# Patient Record
Sex: MALE | Race: WHITE
[De-identification: names, ages, dates, MRNs, and addresses within clinical notes are randomized per-mention and may not be internally consistent; named-entity substitution may affect disease eponyms.]

---

## 2017-04-05 NOTE — ER DOCUMENT REPORT
ED Medical Screen (RME)





- General


Chief Complaint: Skin Sore(s)


Stated Complaint: SKIN PROBLEM


Notes: 


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

2015.  He is left with a left hemiparesis.  He does get up and walk with a 4 

foot he cane.  He reports he has had problems with that source, he was on a 24 

day course of antibiotics which she finished 2 weeks ago.  He is told by his 

attendant that there is some drainage from one of the bedsores at this time.  

He does sleep on his back and thinks that and another time he sits in a 

wheelchair may be contributing to the problem.





I have greeted and performed a rapid initial assessment of this patient.  A 

comprehensive ED assessment and evaluation of the patient, analysis of test 

results and completion of the medical decision making process will be conducted 

by additional ED providers.


TRAVEL OUTSIDE OF THE [place] IN LAST 30 DAYS: No





- Related Data


Allergies/Adverse Reactions: 


 





No Known Allergies Allergy (Verified 04/05/17 10:44)


 











Past Medical History


Renal/ Medical History: Denies: Hx Peritoneal Dialysis





Physical Exam





- Vital signs


Vitals: 


 











Temp Pulse Resp BP Pulse Ox


 


 98.4 F   68   14   99/60 L  96 


 


 04/05/17 10:48  04/05/17 10:48  04/05/17 10:48  04/05/17 10:48  04/05/17 10:48














Course





- Vital Signs


Vital signs: 


 











Temp Pulse Resp BP Pulse Ox


 


 98.4 F   68   14   99/60 L  96 


 


 04/05/17 10:48  04/05/17 10:48  04/05/17 10:48  04/05/17 10:48  04/05/17 10:48

## 2017-04-05 NOTE — ER DOCUMENT REPORT
ED Skin Rash/Insect Bite/Abscs





- General


Chief Complaint: Skin Sore(s)


Stated Complaint: SKIN PROBLEM


Mode of Arrival: Ambulatory


Information source: Patient


TRAVEL OUTSIDE OF THE [place] IN LAST 30 DAYS: No





- HPI


Patient complains to provider of: Skin rash/lesion


Onset: This morning


Severity: None


Notes: 


Patient lives with his family at the bedside.  The patient had a stroke is 

hemiplegic.  He has a known wound to the sacral area and is being treated by 

wound care and a home nurse.  This morning the home nurse came was noted to 

have blood through his depends and through the sheets into the mattress.  They 

believe that the blood came from his bed sore.  No active bleeding currently.  

No bright red in nature.  He denies any pain, fever, abdominal pain, nausea, 

vomiting, diarrhea.  He denies any blood thinners.  He denies any injury to 

this area.  Feels like the bleeding has since resolved.  No other complaints at 

this moment.





- Related Data


Allergies/Adverse Reactions: 


 





No Known Allergies Allergy (Verified 04/05/17 13:24)


 











Past Medical History





- Social History


Smoking Status: Unknown if Ever Smoked


Family History: Reviewed & Not Pertinent


Patient has suicidal ideation: No


Patient has homicidal ideation: No


Renal/ Medical History: Denies: Hx Peritoneal Dialysis





Review of Systems





- Review of Systems


-: Yes All other systems reviewed and negative





Physical Exam





- Vital signs


Vitals: 


 











Temp Pulse Resp BP Pulse Ox


 


 98.4 F   68   14   99/60 L  96 


 


 04/05/17 10:48  04/05/17 10:48  04/05/17 10:48  04/05/17 10:48  04/05/17 10:48














- General


General appearance: Appears well, Alert





- HEENT


Head: Normocephalic, Atraumatic


Eyes: Normal


Pupils: PERRL


Mucous membranes: Normal


Pharynx: Normal





- Respiratory


Respiratory status: No respiratory distress


Breath sounds: Normal





- Cardiovascular


Rhythm: Regular


Heart sounds: Normal auscultation


Murmur: No





- Abdominal


Inspection: Normal


Distension: No distension


Bowel sounds: Normal


Tenderness: Nontender


Organomegaly: No organomegaly





- Rectal


Stool: See lab result, Other - Patient had light brown colored stool, there is 

no obvious blood, no melanotic stool.


Hemorrhoids: None





- Back


Back: Normal, Nontender





- Extremities


General upper extremity: Normal inspection, Nontender, Normal color, Normal ROM

, Normal temperature


General lower extremity: Normal inspection, Nontender, Normal color, Normal ROM

, Normal temperature, Normal weight bearing.  No: Darlene's sign





- Neurological


Cognition: Normal


Orientation: AAOx4


Notes: 


Weakness to the right side and prior stroke





- Psychological


Associated symptoms: Normal affect, Normal mood





- Skin


Skin Temperature: Warm


Skin Moisture: Dry


Skin Color: Normal


Notes: 


Patient is a few sinuses to the gluteal cleft and a small scabbed area to the 

buttock.  There is no significant bed sores noted.  There is no bleeding noted.

  There is small amount of dried blood within his dependent.  No tenderness or 

erythema.





Course





- Re-evaluation


Re-evalutation: 





04/05/17 14:01


Patient is nontoxic appearing stable vitals.  The patient is being treated for 

pressure ulcers to his buttock by wound care.  This morning his home nurse 

noticed that he had blood in his depends that had gone through his depends his 

sheets and his mattress.  They're concerned that his wounds were bleeding.  No 

active bleeding once I saw him in the emergency department.  He is on no blood 

thinners.  He has a benign exam with a few small areas of skin breakdown to the 

sacral area.  Do not appreciate any active bleeding at this time.  Rectal exam 

was benign with light green color stool which was Hemoccult negative.  

Hemoglobin is stable.  The remainder of his labs are stable.  He denies any 

fever and has no pain.  This point the patient can be discharged home with 

close follow-up with his own doctor.  Follow-up sooner for increased pain, fever

, persistent vomiting, or any further concerns.The patient's emergency 

department workup and current diagnosis were explained to the patient and or 

family.  Follow-up instructions were provided.  Medications if prescribed were 

discussed. Instructions for when to return to the emergency department 

including specific  worrisome symptoms were discussed with the patient and/or 

family.





- Vital Signs


Vital signs: 


 











Temp Pulse Resp BP Pulse Ox


 


 98.4 F   68   14   99/60 L  96 


 


 04/05/17 10:48  04/05/17 10:48  04/05/17 10:48  04/05/17 10:48  04/05/17 10:48














- Laboratory


Result Diagrams: 


 04/05/17 11:40





 04/05/17 11:40


Laboratory results interpreted by me: 


 











  04/05/17





  11:40


 


RDW  15.0 H














Discharge





- Discharge


Clinical Impression: 


 Bleeding from wound





Condition: Stable


Disposition: HOME, SELF-CARE


Instructions:  Dressing Instructions for Open Wounds ([facility])


Additional Instructions: 


Follow-up with your doctor the next day appointment.  Follow-up sooner for 

increased pain, fever, increased bleeding, passing out, or any further concerns.

## 2017-08-08 ENCOUNTER — HOSPITAL ENCOUNTER (OUTPATIENT)
Dept: HOSPITAL 62 - ER | Age: 67
Setting detail: OBSERVATION
LOS: 2 days | Discharge: HOME HEALTH | End: 2017-08-10
Attending: FAMILY MEDICINE | Admitting: FAMILY MEDICINE
Payer: MEDICARE

## 2017-08-08 DIAGNOSIS — I25.10: ICD-10-CM

## 2017-08-08 DIAGNOSIS — R11.2: ICD-10-CM

## 2017-08-08 DIAGNOSIS — N39.0: ICD-10-CM

## 2017-08-08 DIAGNOSIS — K21.9: ICD-10-CM

## 2017-08-08 DIAGNOSIS — I69.354: ICD-10-CM

## 2017-08-08 DIAGNOSIS — N28.9: ICD-10-CM

## 2017-08-08 DIAGNOSIS — I65.29: ICD-10-CM

## 2017-08-08 DIAGNOSIS — Z79.82: ICD-10-CM

## 2017-08-08 DIAGNOSIS — Z82.3: ICD-10-CM

## 2017-08-08 DIAGNOSIS — E78.5: ICD-10-CM

## 2017-08-08 DIAGNOSIS — R19.7: ICD-10-CM

## 2017-08-08 DIAGNOSIS — L89.302: ICD-10-CM
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
## 2017-08-08 DIAGNOSIS — Z79.899: ICD-10-CM

## 2017-08-08 DIAGNOSIS — I95.1: Primary | ICD-10-CM

## 2017-08-08 DIAGNOSIS — Z87.891: ICD-10-CM

## 2017-08-08 DIAGNOSIS — Z82.49: ICD-10-CM

## 2017-08-08 LAB
ALBUMIN SERPL-MCNC: 3.7 G/DL (ref 3.5–5)
ALP SERPL-CCNC: 77 U/L (ref 38–126)
ALT SERPL-CCNC: 46 U/L (ref 21–72)
ANION GAP SERPL CALC-SCNC: 7 MMOL/L (ref 5–19)
APPEARANCE UR: (no result)
AST SERPL-CCNC: 31 U/L (ref 17–59)
BASOPHILS # BLD AUTO: 0 10^3/UL (ref 0–0.2)
BASOPHILS NFR BLD AUTO: 0.5 % (ref 0–2)
BILIRUB DIRECT SERPL-MCNC: 0.3 MG/DL (ref 0–0.4)
BILIRUB SERPL-MCNC: 0.4 MG/DL (ref 0.2–1.3)
BILIRUB UR QL STRIP: NEGATIVE
BUN SERPL-MCNC: 16 MG/DL (ref 7–20)
CALCIUM: 9 MG/DL (ref 8.4–10.2)
CHLORIDE SERPL-SCNC: 110 MMOL/L (ref 98–107)
CK MB SERPL-MCNC: 1.3 NG/ML (ref ?–4.55)
CK SERPL-CCNC: 45 U/L (ref 55–170)
CO2 SERPL-SCNC: 27 MMOL/L (ref 22–30)
CREAT SERPL-MCNC: 0.74 MG/DL (ref 0.52–1.25)
EOSINOPHIL # BLD AUTO: 0 10^3/UL (ref 0–0.6)
EOSINOPHIL NFR BLD AUTO: 0.5 % (ref 0–6)
ERYTHROCYTE [DISTWIDTH] IN BLOOD BY AUTOMATED COUNT: 15.1 % (ref 11.5–14)
GLUCOSE SERPL-MCNC: 114 MG/DL (ref 75–110)
GLUCOSE UR STRIP-MCNC: NEGATIVE MG/DL
HCT VFR BLD CALC: 41.5 % (ref 37.9–51)
HGB BLD-MCNC: 14 G/DL (ref 13.5–17)
HGB HCT DIFFERENCE: 0.5
KETONES UR STRIP-MCNC: (no result) MG/DL
LYMPHOCYTES # BLD AUTO: 0.8 10^3/UL (ref 0.5–4.7)
LYMPHOCYTES NFR BLD AUTO: 8.5 % (ref 13–45)
MCH RBC QN AUTO: 31.6 PG (ref 27–33.4)
MCHC RBC AUTO-ENTMCNC: 33.8 G/DL (ref 32–36)
MCV RBC AUTO: 93 FL (ref 80–97)
MONOCYTES # BLD AUTO: 0.6 10^3/UL (ref 0.1–1.4)
MONOCYTES NFR BLD AUTO: 6.8 % (ref 3–13)
NEUTROPHILS # BLD AUTO: 7.9 10^3/UL (ref 1.7–8.2)
NEUTS SEG NFR BLD AUTO: 83.7 % (ref 42–78)
NITRITE UR QL STRIP: NEGATIVE
PH UR STRIP: 5 [PH] (ref 5–9)
POTASSIUM SERPL-SCNC: 4.2 MMOL/L (ref 3.6–5)
PROT SERPL-MCNC: 6.5 G/DL (ref 6.3–8.2)
PROT UR STRIP-MCNC: 30 MG/DL
RBC # BLD AUTO: 4.44 10^6/UL (ref 4.35–5.55)
SODIUM SERPL-SCNC: 144.2 MMOL/L (ref 137–145)
SP GR UR STRIP: 1.03
TROPONIN I SERPL-MCNC: < 0.012 NG/ML
UROBILINOGEN UR-MCNC: NEGATIVE MG/DL (ref ?–2)
WBC # BLD AUTO: 9.4 10^3/UL (ref 4–10.5)

## 2017-08-08 PROCEDURE — 84100 ASSAY OF PHOSPHORUS: CPT

## 2017-08-08 PROCEDURE — 87086 URINE CULTURE/COLONY COUNT: CPT

## 2017-08-08 PROCEDURE — 70450 CT HEAD/BRAIN W/O DYE: CPT

## 2017-08-08 PROCEDURE — 36415 COLL VENOUS BLD VENIPUNCTURE: CPT

## 2017-08-08 PROCEDURE — 71010: CPT

## 2017-08-08 PROCEDURE — 93010 ELECTROCARDIOGRAM REPORT: CPT

## 2017-08-08 PROCEDURE — 85025 COMPLETE CBC W/AUTO DIFF WBC: CPT

## 2017-08-08 PROCEDURE — 82550 ASSAY OF CK (CPK): CPT

## 2017-08-08 PROCEDURE — 84484 ASSAY OF TROPONIN QUANT: CPT

## 2017-08-08 PROCEDURE — 80053 COMPREHEN METABOLIC PANEL: CPT

## 2017-08-08 PROCEDURE — 83605 ASSAY OF LACTIC ACID: CPT

## 2017-08-08 PROCEDURE — 99285 EMERGENCY DEPT VISIT HI MDM: CPT

## 2017-08-08 PROCEDURE — G0378 HOSPITAL OBSERVATION PER HR: HCPCS

## 2017-08-08 PROCEDURE — 87040 BLOOD CULTURE FOR BACTERIA: CPT

## 2017-08-08 PROCEDURE — 84443 ASSAY THYROID STIM HORMONE: CPT

## 2017-08-08 PROCEDURE — 82553 CREATINE MB FRACTION: CPT

## 2017-08-08 PROCEDURE — 80048 BASIC METABOLIC PNL TOTAL CA: CPT

## 2017-08-08 PROCEDURE — 96360 HYDRATION IV INFUSION INIT: CPT

## 2017-08-08 PROCEDURE — 97116 GAIT TRAINING THERAPY: CPT

## 2017-08-08 PROCEDURE — 93005 ELECTROCARDIOGRAM TRACING: CPT

## 2017-08-08 PROCEDURE — 97163 PT EVAL HIGH COMPLEX 45 MIN: CPT

## 2017-08-08 PROCEDURE — 81001 URINALYSIS AUTO W/SCOPE: CPT

## 2017-08-08 PROCEDURE — 83735 ASSAY OF MAGNESIUM: CPT

## 2017-08-08 NOTE — ER DOCUMENT REPORT
ED Dizziness/Weakness





- General


Mode of Arrival: Ambulatory


Information source: Patient


TRAVEL OUTSIDE OF THE [place] IN LAST 30 DAYS: No





- HPI


Patient complains to provider of: Dizziness, Syncope, Other - vomiting


Onset: Just prior to arrival





<ALISSON HERNÁNDEZ - Last Filed: 08/08/17 20:56>





<FRENCH PENA - Last Filed: 08/08/17 23:09>





- General


Chief Complaint: Dizziness


Stated Complaint: DIZZINESS


Time Seen by Provider: 08/08/17 15:16


Notes: 


Patient is a 67 year old male that presents to the emergency department today 

with complaints of feeling lightheaded with associated vomiting. Caretaker at 

bedside states the patient vomited at home a couple of days ago as well. 

Caregiver states that two days ago, when the patient was alone, he fell when 

walking from the kitchen to the living room. Caregiver states earlier today the 

patient fell and began vomiting after the fall. Caregiver and patient deny any 

recent fevers.  (ALISSON HERNÁNDEZ)





- Related Data


Allergies/Adverse Reactions: 


 





No Known Allergies Allergy (Verified 08/08/17 18:39)


 








Home Medications: 


 Current Home Medications





Aspirin [Ecotrin 325 mg EC Tablet] 325 mg PO DAILY 08/08/17 [History]


Atorvastatin Calcium [Lipitor 40 mg Tablet] 40 mg PO QHS 08/08/17 [History]


Baclofen [Baclofen 10 mg Tablet] 10 mg PO QID 08/08/17 [History]


Docusate Sodium [Colace 100 mg Capsule] 100 mg PO DAILY 08/08/17 [History]


Esomeprazole Magnesium [Nexium] 40 mg PO QAM 08/08/17 [History]


Finasteride [Proscar 5 mg Tablet] 5 mg PO DAILY 08/08/17 [History]


Fluoxetine HCl [Prozac 20 mg Capsule] 20 mg PO DAILY 08/08/17 [History]


Melatonin/Pyridoxine HCl (B6) [Melatonin 3 mg Tablet] 1 each PO QHS 08/08/17 [

History]


Tadalafil [Cialis] 5 mg PO DAILY 08/08/17 [History]


Tamsulosin HCl [Flomax 0.4 mg Cap.sr] 0.4 mg PO BID 08/08/17 [History]


Temazepam [Restoril] 30 mg PO QHS 08/08/17 [History]











Past Medical History





- General


Information source: Patient





- Social History


Smoking Status: Never Smoker


Cigarette use (# per day): No


Frequency of alcohol use: None


Drug Abuse: None


Lives with: Family


Family History: Reviewed & Not Pertinent





- Past Medical History


Cardiac Medical History: Reports: Hx Hypercholesterolemia


GI Medical History: Reports: Hx Gastroesophageal Reflux Disease


Surgical Hx: Negative





<ALISSON HERNÁNDEZ - Last Filed: 08/08/17 20:56>





Review of Systems





- Review of Systems


Constitutional: denies: Fever


EENT: No symptoms reported


Cardiovascular: See HPI, Lightheaded, Other - hypotensive


Respiratory: No symptoms reported


Gastrointestinal: See HPI, Vomiting


Genitourinary: No symptoms reported


Male Genitourinary: No symptoms reported


Musculoskeletal: No symptoms reported


Skin: No symptoms reported


Hematologic/Lymphatic: No symptoms reported


Neurological/Psychological: No symptoms reported


-: Yes All other systems reviewed and negative





<ALISSON HERNÁNDEZ - Last Filed: 08/08/17 20:56>





Physical Exam





- Vital signs


Interpretation: Hypotensive





<ALISSON HERNÁNDEZ - Last Filed: 08/08/17 20:56>





- Skin


Skin irregularity: Decubitus ulcer





<FRENCH PENA - Last Filed: 08/08/17 23:09>





- Vital signs


Vitals: 


 











Temp Pulse Resp BP Pulse Ox


 


 98.2 F   65   18   104/47 L  95 


 


 08/08/17 15:18  08/08/17 15:18  08/08/17 15:18  08/08/17 15:18  08/08/17 15:18














- Notes


Notes: 


Physical Exam:


 


General: Alert, appears at baseline. 


 


HEENT: Normocephalic. Atraumatic. PERRL. Extraocular movements intact. 

Oropharynx clear. Dry mucous membranes.


 


Neck: Supple. Non-tender.


 


Respiratory: No respiratory distress. Clear and equal breath sounds bilaterally.


 


Cardiovascular: Regular rate and rhythm. 


 


Abdominal: Normal Inspection. Non-tender. No distension. Normal Bowel Sounds. 


 


Back: Non-tender. No deformity or step off.


 


Extremities: Moves all four extremities.


Upper extremities: Normal inspection. Normal ROM.  


Lower extremities: Normal inspection. No edema. Normal ROM.


 


Neurological: Normal cognition. AAOx4. Normal speech. 5/5 strength on the right 

upper/lower extremities, 4/5 strength on left upper/lower extremities which is 

at baseline. 


 


Psychological: Normal affect. Normal Mood. 


 


Skin: Warm. Dry. Normal color. (ALISSON HERNÁNDEZ)





Course





- Laboratory


Result Diagrams: 


 08/08/17 16:01





 08/08/17 16:01





<ALISSON HERNÁNDEZ - Last Filed: 08/08/17 20:56>





- Laboratory


Result Diagrams: 


 08/08/17 16:01





 08/08/17 16:01





- Diagnostic Test


Radiology reviewed: Reports reviewed





<FRENCH PENA - Last Filed: 08/08/17 23:09>





- Re-evaluation


Re-evalutation: 





08/08/17 


Patient is a 67-year-old male who presents with a blood pressure of 70s over 

40s.  Patient had a lot of vomiting at home.  Patient has no complaints 

currently except that initially he felt lightheaded.  Patient is responded well 

to fluids with still gets lightheaded when he stands up.  I cannot find any 

infectious source at this time and the patient has remained afebrile.  No acute 

findings on CT or x-ray.  Urine with only a few WBCs but lots of RBCs.  

Recommend that the patient spend the night.  Understands agrees with this plan.

  He has been referred to the hospitalist service for admission.  Stable at 

time of admission. (FRENCH PENA)





- Vital Signs


Vital signs: 


 











Temp Pulse Resp BP Pulse Ox


 


 99.5 F   69   18   108/48 L  95 


 


 08/08/17 22:36  08/08/17 22:36  08/08/17 22:36  08/08/17 22:36  08/08/17 22:36














- Laboratory


Laboratory results interpreted by me: 


 











  08/08/17 08/08/17 08/08/17





  16:01 16:01 17:05


 


RDW  15.1 H  


 


Seg Neutrophils %  83.7 H  


 


Lymphocytes %  8.5 L  


 


Chloride   110 H 


 


Glucose   114 H 


 


Creatine Kinase   45 L 


 


Urine Protein    30 H


 


Urine Ketones    TRACE H


 


Urine Blood    LARGE H














Discharge





<ALISSON HERNÁNDEZ - Last Filed: 08/08/17 20:56>





- Discharge


Admitting Provider: Hospitalist - Banzon


Unit Admitted: Telemetry





<FRENCH PENA - Last Filed: 08/08/17 23:09>





- Discharge


Clinical Impression: 


Vomiting


Qualifiers:


 Vomiting type: unspecified Vomiting Intractability: non-intractable Nausea 

presence: with nausea Qualified Code(s): R11.2 - Nausea with vomiting, 

unspecified





Hypotension


Qualifiers:


 Hypotension type: unspecified hypotension type Qualified Code(s): I95.9 - 

Hypotension, unspecified





Condition: Stable


Disposition: ADMITTED AS INPATIENT


Scribe Attestation: 





08/08/17 23:09


I personally performed the services described in the documentation, reviewed 

and edited the documentation which was dictated to the scribe in my presence, 

and it accurately records my words and actions. (FRENCH PENA)





Scribe Documentation





- Scribe


Written by Scribe:: Lanny Santamaria, 8/8/2017 1922


acting as scribe for :: Ed





<ALISSON HERNÁNDEZ - Last Filed: 08/08/17 20:56>

## 2017-08-08 NOTE — RADIOLOGY REPORT (SQ)
EXAM DESCRIPTION:  CT HEAD WITHOUT



COMPLETED DATE/TIME:  8/8/2017 5:59 pm



REASON FOR STUDY:  fall, n/v



COMPARISON:  7/10/2015



TECHNIQUE:  Axial images acquired through the brain without intravenous contrast.  Images reviewed wi
th bone, brain and subdural windows.  Images stored on PACS.

All CT scanners at this facility use dose modulation, iterative reconstruction, and/or weight based d
osing when appropriate to reduce radiation dose to as low as reasonably achievable (ALARA).

[facility]: Dose Right  [facility]: CareDose    [facility]: Dose Right    [facility]: Teradose 4D    [facility]: Smart Server Density



RADIATION DOSE:  mGy.



LIMITATIONS:  None.



FINDINGS:  VENTRICLES: Prominent.

CEREBRUM: There are areas of old infarcts in the right MCA distribution the left MCA distribution.  T
he old infarcts are in the right middle lobe and left parietal lobe.  There is no focal mass, hemorrh
age or midline shift.

CEREBELLUM: No masses.  No hemorrhage.  No alteration of density.  No evidence for acute infarction.

EXTRAAXIAL SPACES: Age-related involutional change.  No fluid collections.  No masses.

ORBITS AND GLOBE: No intra- or extraconal masses.  Normal contour of globe without masses.

CALVARIUM: No fracture.

PARANASAL SINUSES: There is a retention cyst or polyp in the right maxillary sinus.

SOFT TISSUES: No mass or hematoma.

OTHER: No other significant finding.



IMPRESSION:  Multiple old infarcts.  Mild atrophy.  No acute findings.



TECHNICAL DOCUMENTATION:  JOB ID:  [id number]

Quality ID # [id number]: Final reports with documentation of one or more dose reduction techniques (e.g., Au
tomated exposure control, adjustment of the mA and/or kV according to patient size, use of iterative 
reconstruction technique)

 2011 [employer]- All Rights Reserved

## 2017-08-08 NOTE — PDOC H&P
History of Present Illness


Admission Date/PCP: 


  





  CHRIS HADDAD DO





Patient complains of: syncope


History of Present Illness: 


JOSY KLEIN is a 67 year old male, with history of stroke and left-sided 

weakness just came from a doctor's office for follow-up of his decubitus ulcers 

apparently felt lightheaded and dizzy at home and subsequently had an episode 

of nausea and vomiting and a syncopal episode.  Patient had recent falls a few 

days ago as well but no significant injury.  A week ago the patient had a CT 

scan of her pelvis for reported decubitus ulcer but there was no purulent 

drainage or evidence of infection and ulcers.  Patient has no chills or fever, 

abdominal pain, diarrhea.  He does have some constipation but no dysuria 

urgency or frequency.  No penile discharge as well nor hematuria.  In the ER 

the patient was reported to be orthostatic with systolic blood pressure in the 

70s.  Intravenous fluids was given and blood pressure has normalized.  The 

patient was then referred for admission.








Past Medical History


Past Medical History: 


Medication reconciliation pending verification from the patient's pharmacist.


Cardiac Medical History: Reports: Hyperlipidema, Other - Carotid artery disease


Neurological Medical History: Reports: Ischemic CVA - Left-sided hemiparesis


GI Medical History: Reports: Gastroesophageal Reflux Disease


Skin Medical History: Reports: Other - Decubitus ulcers on the buttocks





Past Surgical History


Past Surgical History: Reports: None - Patient denies any recent operation.  

Patient had remote history of sharp no wounds





Social History


Information Source: Patient


Lives with: Family


Smoking Status: Former Smoker


Frequency of Alcohol Use: Occasional


Hx Recreational Drug Use: No


Drugs: None


Hx Prescription Drug Abuse: No





Family History


Family History: CAD, CVA, Hypertension


Parental Family History Reviewed: Yes


Children Family History Reviewed: Yes


Sibling(s) Family History Reviewed.: Yes





Medication/Allergy


Allergies/Adverse Reactions: 


 





No Known Allergies Allergy (Verified 08/08/17 18:39)


 











Review of Systems


Constitutional: PRESENT: weakness - No new weakness reported chronic left-sided 

weakness, other - Ambulates with a walker.  ABSENT: chills, fever(s), headache(s

), night sweats, weight gain, weight loss


Eyes: ABSENT: visual disturbances


Ears: ABSENT: hearing changes


Nose, Mouth, and Throat: ABSENT: mouth pain, sore throat


Cardiovascular: ABSENT: chest pain, dyspnea on exertion, edema, orthropnea, 

palpitations


Respiratory: ABSENT: cough, dyspnea, hemoptysis, sputum


Gastrointestinal: PRESENT: constipation, diarrhea - Earlier today after 

arriving from the clinic, nausea, vomiting.  ABSENT: abdominal pain, hematemesis

, hematochezia, melena


Genitourinary: ABSENT: difficulty urinating, dysuria, hematuria


Musculoskeletal: ABSENT: joint swelling


Integumentary: PRESENT: wounds - On the buttocks.  ABSENT: pruritus, rash


Neurological: PRESENT: dizziness, focal weakness - Chronic on the left side, 

syncope.  ABSENT: abnormal gait, abnormal speech, confusion


Psychiatric: ABSENT: anxiety, depression, homidical ideation, suicidal ideation


Endocrine: ABSENT: cold intolerance, heat intolerance, polydipsia, polyuria


Hematologic/Lymphatic: ABSENT: easy bleeding, easy bruising





Physical Exam


Vital Signs: 


 











Temp Pulse Resp BP Pulse Ox


 


 98.2 F   86   18   140/66 H  93 


 


 08/08/17 15:30  08/08/17 15:30  08/08/17 18:45  08/08/17 18:45  08/08/17 18:45








 Intake & Output











 08/07/17 08/08/17 08/09/17





 06:59 06:59 06:59


 


Weight   140 kg











General appearance: PRESENT: no acute distress, cooperative


Head exam: PRESENT: atraumatic, normocephalic


Eye exam: PRESENT: conjunctiva pink, EOMI, PERRLA.  ABSENT: scleral icterus


Ear exam: PRESENT: normal external ear exam.  ABSENT: drainage


Mouth exam: PRESENT: moist, neck supple, tongue midline


Throat exam: ABSENT: post pharyngeal erythema, tonsillar erythema


Neck exam: ABSENT: carotid bruit, JVD, lymphadenopathy, thyromegaly


Respiratory exam: PRESENT: clear to auscultation rosalba.  ABSENT: rales, rhonchi, 

wheezes


Cardiovascular exam: PRESENT: RRR, +S1, +S2.  ABSENT: diastolic murmur, rubs, 

systolic murmur


Pulses: PRESENT: normal dorsalis pedis pul


Vascular exam: PRESENT: normal capillary refill


GI/Abdominal exam: PRESENT: normal bowel sounds, soft.  ABSENT: distended, 

guarding, mass, organolmegaly, rebound, tenderness


Rectal exam: PRESENT: deferred


Extremities exam: PRESENT: full ROM, other - Atrophy of the left lower 

extremity.  ABSENT: calf tenderness, clubbing, pedal edema


Neurological exam: PRESENT: alert, awake, oriented to situation, other - 4-/5 

weakness in the left


Psychiatric exam: PRESENT: appropriate affect, normal mood.  ABSENT: homicidal 

ideation, suicidal ideation


Skin exam: PRESENT: dry, intact, warm.  ABSENT: cyanosis, rash





Results


Laboratory Results: 


 





 08/08/17 16:01 





 08/08/17 16:01 





 











  08/08/17 08/08/17 08/08/17





  16:01 16:01 17:05


 


WBC  9.4  


 


RBC  4.44  


 


Hgb  14.0  


 


Hct  41.5  


 


MCV  93  


 


MCH  31.6  


 


MCHC  33.8  


 


RDW  15.1 H  


 


Plt Count  171  


 


Seg Neutrophils %  83.7 H  


 


Lymphocytes %  8.5 L  


 


Monocytes %  6.8  


 


Eosinophils %  0.5  


 


Basophils %  0.5  


 


Absolute Neutrophils  7.9  


 


Absolute Lymphocytes  0.8  


 


Absolute Monocytes  0.6  


 


Absolute Eosinophils  0.0  


 


Absolute Basophils  0.0  


 


Sodium   144.2 


 


Potassium   4.2 


 


Chloride   110 H 


 


Carbon Dioxide   27 


 


Anion Gap   7 


 


BUN   16 


 


Creatinine   0.74 


 


Est GFR ( Amer)   > 60 


 


Est GFR (Non-Af Amer)   > 60 


 


Glucose   114 H 


 


Calcium   9.0 


 


Total Bilirubin   0.4 


 


AST   31 


 


ALT   46 


 


Alkaline Phosphatase   77 


 


Total Protein   6.5 


 


Albumin   3.7 


 


Urine Color    YELLOW


 


Urine Appearance    SLIGHTLY-CLOUDY


 


Urine pH    5.0


 


Ur Specific Gravity    1.027


 


Urine Protein    30 H


 


Urine Glucose (UA)    NEGATIVE


 


Urine Ketones    TRACE H


 


Urine Blood    LARGE H


 


Urine Nitrite    NEGATIVE


 


Ur Leukocyte Esterase    NEGATIVE


 


Urine WBC (Auto)    10


 


Urine RBC (Auto)    114








 











  08/08/17 08/08/17





  16:01 16:01


 


Creatine Kinase  45 L 


 


CK-MB (CK-2)   1.30


 


Troponin I   < 0.012











Impressions: 


 





Chest X-Ray  08/08/17 15:22


IMPRESSION:  Mild chronic lung changes with no acute cardiopulmonary disease.


 








Head CT  08/08/17 17:02


IMPRESSION:  Multiple old infarcts.  Mild atrophy.  No acute findings.


 














Assessment & Plan





- Diagnosis


(1) Syncope due to orthostatic hypotension


Is this a current diagnosis for this admission?: Yes





(2) Urinary tract infection


Qualifiers: 


     Urinary tract infection type: site unspecified     Hematuria presence: 

without hematuria        Qualified Code(s): N39.0 - Urinary tract infection, 

site not specified  


Is this a current diagnosis for this admission?: Yes





(3) Carotid atherosclerosis


Qualifiers: 


     Laterality: unspecified laterality        Qualified Code(s): I65.29 - 

Occlusion and stenosis of unspecified carotid artery  


Is this a current diagnosis for this admission?: Yes





(4) Hyperlipidemia


Qualifiers: 


     Hyperlipidemia type: unspecified        Qualified Code(s): E78.5 - 

Hyperlipidemia, unspecified  


Is this a current diagnosis for this admission?: Yes





(5) Decubitus ulcer of buttock, stage 2


Qualifiers: 


     Laterality: unspecified laterality        Qualified Code(s): L89.302 - 

Pressure ulcer of unspecified buttock, stage 2  


Is this a current diagnosis for this admission?: Yes





(6) History of stroke


Is this a current diagnosis for this admission?: Yes








- Time


Time Spent: 30 to 50 Minutes





- Inpatient Certification


Based on my medical assessment, after consideration of the patient's 

comorbidities, presenting symptoms, or acuity I expect that the services needed 

warrant INPATIENT care.: Yes


I certify that my determination is in accordance with my understanding of 

Medicare's requirements for reasonable and necessary INPATIENT services [42 CFR 

412.3e].: Yes


Medical Necessity: Need Close Monitoring Due to Risk of Patient Decompensation, 

Need For IV Fluids, Risk of Complication if Not Cared For in Hospital


Post Hospital Care: D/C Planner Documentation





- Plan Summary


Plan Summary: 


Patient will be admitted to telemetry.  We will hydrate the patient with normal 

saline.  We will culture the urine and begin ceftriaxone.  We will apply 

Allevyn dressing to the wound.  Begin physical therapy.  DVT prophylaxis with 

Lovenox will be placed.  Further testing depends on the initial evaluations 

outlined above.

## 2017-08-08 NOTE — RADIOLOGY REPORT (SQ)
EXAM DESCRIPTION:  CHEST SINGLE VIEW



COMPLETED DATE/TIME:  8/8/2017 6:11 pm



REASON FOR STUDY:  syncope



COMPARISON:  7/10/2015



EXAM PARAMETERS:  NUMBER OF VIEWS: One view.

TECHNIQUE: Single frontal radiographic view of the chest acquired.

RADIATION DOSE: NA

LIMITATIONS: None.



FINDINGS:  LUNGS AND PLEURA: Mild chronic interstitial changes are present.  Minimal subsegmental ate
lectasis is present in the left base.  There is no acute infiltrate or effusion.  There is no mass.

MEDIASTINUM AND HILAR STRUCTURES: No masses.  Contour normal.

HEART AND VASCULAR STRUCTURES: Heart normal in size.  Normal vasculature.

BONES: No acute findings.

HARDWARE: None in the chest.

OTHER: No other significant finding.



IMPRESSION:  Mild chronic lung changes with no acute cardiopulmonary disease.



TECHNICAL DOCUMENTATION:  JOB ID:  [id number]

## 2017-08-09 LAB
ANION GAP SERPL CALC-SCNC: 8 MMOL/L (ref 5–19)
BUN SERPL-MCNC: 13 MG/DL (ref 7–20)
CALCIUM: 8.7 MG/DL (ref 8.4–10.2)
CHLORIDE SERPL-SCNC: 111 MMOL/L (ref 98–107)
CO2 SERPL-SCNC: 24 MMOL/L (ref 22–30)
CREAT SERPL-MCNC: 0.69 MG/DL (ref 0.52–1.25)
GLUCOSE SERPL-MCNC: 101 MG/DL (ref 75–110)
MAGNESIUM SERPL-MCNC: 1.9 MG/DL (ref 1.6–2.3)
PHOSPHATE SERPL-MCNC: 3.2 MG/DL (ref 2.5–4.5)
POTASSIUM SERPL-SCNC: 4.1 MMOL/L (ref 3.6–5)
SODIUM SERPL-SCNC: 142.5 MMOL/L (ref 137–145)

## 2017-08-09 RX ADMIN — ENOXAPARIN SODIUM SCH MG: 40 INJECTION SUBCUTANEOUS at 09:03

## 2017-08-09 RX ADMIN — CEFTRIAXONE SCH ML: 1 INJECTION, SOLUTION INTRAVENOUS at 00:08

## 2017-08-09 RX ADMIN — SODIUM CHLORIDE PRN ML: 9 INJECTION, SOLUTION INTRAVENOUS at 22:00

## 2017-08-09 RX ADMIN — TAMSULOSIN HYDROCHLORIDE SCH MG: 0.4 CAPSULE ORAL at 18:32

## 2017-08-09 RX ADMIN — LANSOPRAZOLE SCH MG: 30 TABLET, ORALLY DISINTEGRATING, DELAYED RELEASE ORAL at 07:50

## 2017-08-09 RX ADMIN — LANSOPRAZOLE SCH MG: 30 TABLET, ORALLY DISINTEGRATING, DELAYED RELEASE ORAL at 16:07

## 2017-08-09 RX ADMIN — CEFTRIAXONE SCH ML: 1 INJECTION, SOLUTION INTRAVENOUS at 21:51

## 2017-08-09 RX ADMIN — SODIUM CHLORIDE PRN ML: 9 INJECTION, SOLUTION INTRAVENOUS at 00:07

## 2017-08-09 NOTE — EKG REPORT
SEVERITY:- BORDERLINE ECG -

SINUS RHYTHM

BORDERLINE INFERIOR Q WAVES

BORDERLINE T ABNORMALITIES, INFERIOR LEADS

:

Confirmed by: Ji Dan 09-Aug-2017 10:51:54

## 2017-08-09 NOTE — PHYSICIAN ADVISORY NOTE
Physician Advisor ProgressNote


.: 


Pursuant to the  plan for [facility], I have reviewed the medical record 

for this patient.  





Physician Advisor Statement: 


Nice documentation of Lt hemiparesis from old CVA.





Please specify whether stage 2 decub, present on adm, is Lt or Rt buttock, or 

midline.








Thanks!


CK

## 2017-08-09 NOTE — PDOC PROGRESS REPORT
Subjective


Progress Note for:: 08/09/17


Subjective:: 


Patient is feeling better but is generally weak.  No dizzy spells or 

lightheadedness.  No nausea or vomiting.  Patient apparently has medications 

that make him drowsy.  Patient likewise takes some as needed medications 

scheduled.  No reported respiratory distress, temperature spikes, nausea or 

vomiting.  Caregiver at bedside who reports problems with constipation.





Physical Exam


Vital Signs: 


 











Temp Pulse Resp BP Pulse Ox


 


 99.1 F   84   18   151/64 H  96 


 


 08/09/17 08:50  08/09/17 08:50  08/09/17 08:50  08/09/17 08:50  08/09/17 08:50








 Intake & Output











 08/08/17 08/09/17 08/10/17





 06:59 06:59 06:59


 


Intake Total  1810 


 


Balance  1810 


 


Weight  65.5 kg 











General appearance: PRESENT: no acute distress, cooperative


Head exam: PRESENT: normocephalic


Eye exam: PRESENT: EOMI


Mouth exam: PRESENT: moist, neck supple


Neck exam: ABSENT: JVD


Respiratory exam: PRESENT: clear to auscultation rosalba.  ABSENT: rhonchi, wheezes


Cardiovascular exam: PRESENT: RRR.  ABSENT: gallop


GI/Abdominal exam: PRESENT: soft.  ABSENT: distended, tenderness


Extremities exam: ABSENT: pedal edema


Neurological exam: PRESENT: alert, awake, oriented to situation


Skin exam: PRESENT: dry, warm - The patient is she is being pulmonary improved.

  ABSENT: cyanosis





Results


Laboratory Results: 


 





 08/09/17 04:31 





 











  08/08/17 08/09/17





  18:54 04:31


 


Sodium   142.5


 


Potassium   4.1


 


Chloride   111 H


 


Carbon Dioxide   24


 


Anion Gap   8


 


BUN   13


 


Creatinine   0.69


 


Est GFR ( Amer)   > 60


 


Est GFR (Non-Af Amer)   > 60


 


Glucose   101


 


Lactic Acid  0.8 


 


Calcium   8.7


 


Phosphorus   3.2


 


Magnesium   1.9











Impressions: 


 





Chest X-Ray  08/08/17 15:22


IMPRESSION:  Mild chronic lung changes with no acute cardiopulmonary disease.


 








Head CT  08/08/17 17:02


IMPRESSION:  Multiple old infarcts.  Mild atrophy.  No acute findings.


 














Assessment & Plan





- Diagnosis


(1) Syncope due to orthostatic hypotension


Is this a current diagnosis for this admission?: Yes





(2) Urinary tract infection


Qualifiers: 


     Urinary tract infection type: site unspecified     Hematuria presence: 

without hematuria        Qualified Code(s): N39.0 - Urinary tract infection, 

site not specified; R31.9 - Hematuria, unspecified  


Is this a current diagnosis for this admission?: Yes





(3) Carotid atherosclerosis


Qualifiers: 


     Laterality: unspecified laterality        Qualified Code(s): I65.29 - 

Occlusion and stenosis of unspecified carotid artery  


Is this a current diagnosis for this admission?: Yes





(4) Hyperlipidemia


Qualifiers: 


     Hyperlipidemia type: unspecified        Qualified Code(s): E78.5 - 

Hyperlipidemia, unspecified  


Is this a current diagnosis for this admission?: Yes





(5) Decubitus ulcer of buttock, stage 2


Qualifiers: 


     Laterality: unspecified laterality        Qualified Code(s): L89.302 - 

Pressure ulcer of unspecified buttock, stage 2  


Is this a current diagnosis for this admission?: Yes





(6) History of stroke


Is this a current diagnosis for this admission?: Yes








- Time


Time Spent with patient: 25-34 minutes





- Plan Summary


Plan Summary: 


Continue physical therapy.  Continue current antibiotics.  Follow cultures.  

Continue supportive care.  Resume home medications except as needed.  I have 

discussed in length with the patient regarding his medications, side effects, 

benefits, indication.  Patient understood and questions were answered.  

Likewise patient known not to drink enough fluids as per caregiver.  Patient 

likewise encourage to increase oral fluid intake for hydration.  Consequences 

explained and he understood.

## 2017-08-10 VITALS — SYSTOLIC BLOOD PRESSURE: 108 MMHG | DIASTOLIC BLOOD PRESSURE: 48 MMHG

## 2017-08-10 RX ADMIN — ENOXAPARIN SODIUM SCH MG: 40 INJECTION SUBCUTANEOUS at 09:52

## 2017-08-10 RX ADMIN — SODIUM CHLORIDE PRN ML: 9 INJECTION, SOLUTION INTRAVENOUS at 07:36

## 2017-08-10 RX ADMIN — LANSOPRAZOLE SCH MG: 30 TABLET, ORALLY DISINTEGRATING, DELAYED RELEASE ORAL at 05:57

## 2017-08-10 RX ADMIN — TAMSULOSIN HYDROCHLORIDE SCH MG: 0.4 CAPSULE ORAL at 09:53

## 2017-08-10 NOTE — PDOC DISCHARGE SUMMARY
General





- Admit/Disc Date/PCP


Admission Date/Primary Care Provider: 


  08/09/17 17:00





  CHRIS ALVARENGATEDO





Discharge Date: 08/10/17





- Discharge Diagnosis


(1) Syncope due to orthostatic hypotension


Is this a current diagnosis for this admission?: Yes





(2) Urinary tract infection


Is this a current diagnosis for this admission?: Yes





(3) Carotid atherosclerosis


Is this a current diagnosis for this admission?: Yes





(4) Hyperlipidemia


Is this a current diagnosis for this admission?: Yes





(5) Decubitus ulcer of buttock, stage 2


Is this a current diagnosis for this admission?: Yes





(6) History of stroke


Is this a current diagnosis for this admission?: Yes








- Additional Information


Discharge Diet: Cardiac - Low-fat low-salt


Discharge Activity: Activity As Tolerated, Balance Activity w/Rest, Slowly 

Increase Activity


Home Medications: 








Aspirin [Ecotrin 325 mg EC Tablet] 325 mg PO DAILY 08/08/17 


Atorvastatin Calcium [Lipitor 40 mg Tablet] 40 mg PO QHS 08/08/17 


Docusate Sodium [Colace 100 mg Capsule] 100 mg PO DAILY 08/08/17 


Esomeprazole Magnesium [Nexium] 40 mg PO QAM 08/08/17 


Finasteride [Proscar 5 mg Tablet] 5 mg PO DAILY 08/08/17 


Fluoxetine HCl [Prozac 20 mg Capsule] 20 mg PO DAILY 08/08/17 


Melatonin/Pyridoxine HCl (B6) [Melatonin 3 mg Tablet] 1 each PO QHS 08/08/17 


Tamsulosin HCl [Flomax 0.4 mg Cap.sr] 0.4 mg PO BID 08/08/17 


Baclofen [Baclofen 10 mg Tablet] 10 mg PO Q8H PRN #0  08/10/17 


Temazepam [Restoril] 0.5 tab PO QHS PRN #0  08/10/17 








Additional Information: 


Return to the emergency room if symptoms recur





History of Present Illness


Patient complains of: Syncope


History of Present Illness: 


JOSY KLEIN is a 67 year old male, with history of stroke and left-sided 

weakness just came from a doctor's office for follow-up of his decubitus ulcers 

apparently felt lightheaded and dizzy at home and subsequently had an episode 

of nausea and vomiting and a syncopal episode.  Patient had recent falls a few 

days ago as well but no significant injury.  A week ago the patient had a CT 

scan of her pelvis for reported decubitus ulcer but there was no purulent 

drainage or evidence of infection and ulcers.  Patient has no chills or fever, 

abdominal pain, diarrhea.  He does have some constipation but no dysuria 

urgency or frequency.  No penile discharge as well nor hematuria.  In the ER 

the patient was reported to be orthostatic with systolic blood pressure in the 

70s.  Intravenous fluids was given and blood pressure has normalized.  The 

patient was then referred for admission.








Hospital Course


Hospital Course: 


The patient was admitted to [facility], [place].  The patient was hydrated with intravenous 

fluids.  Patient's blood pressure improved.  Patient's lightheadedness and 

dizziness on standing eventually resolved.  Patient apparently limiting herself 

with fluid intake.  He also is on medication that causes sedation and likewise 

drop in blood pressure.  Patient was educated about fluid intake as well as 

medication intake and renal insufficiency and she understood.  Patient is 

taking Proscar as well as Cardura and Cialis for his prostate.  He was 

therefore advised to follow-up with his primary care physician or with his 

urologist if they could just discontinue Cialis.  His temazepam dose was 

decreased.  His muscle relaxant was changed to as needed.  On workup patient 

had a urinalysis suggestive of urinary tract infection where he was started on 

antibiotic with ceftriaxone.  Patient received a total of 3 doses.  Cultures 

however were negative and therefore this was discontinued eventually.  Physical 

therapy was instituted.  The rest of the hospital stay is unremarkable.





Physical Exam


Vital Signs: 


 











Temp Pulse Resp BP Pulse Ox


 


 98.7 F   77   18   159/63 H  95 


 


 08/10/17 11:52  08/10/17 11:52  08/10/17 11:52  08/10/17 11:52  08/10/17 11:52








 Intake & Output











 08/09/17 08/10/17 08/11/17





 06:59 06:59 06:59


 


Intake Total  3822 350


 


Output Total  1250 450


 


Balance  2572 -100


 


Weight  63.5 kg 











General appearance: PRESENT: no acute distress, cooperative


Head exam: PRESENT: normocephalic


Eye exam: PRESENT: EOMI


Mouth exam: PRESENT: moist, neck supple


Neck exam: ABSENT: JVD


Respiratory exam: PRESENT: clear to auscultation rosalba.  ABSENT: rhonchi, wheezes


Cardiovascular exam: PRESENT: RRR.  ABSENT: gallop


GI/Abdominal exam: PRESENT: normal bowel sounds, soft.  ABSENT: distended, 

tenderness


Extremities exam: ABSENT: pedal edema


Neurological exam: PRESENT: alert, awake, oriented to situation


Skin exam: PRESENT: dry, warm.  ABSENT: cyanosis





Results


Impressions: 


 





Chest X-Ray  08/08/17 15:22


IMPRESSION:  Mild chronic lung changes with no acute cardiopulmonary disease.


 








Head CT  08/08/17 17:02


IMPRESSION:  Multiple old infarcts.  Mild atrophy.  No acute findings.


 














Qualifiers


**PATEINT BEING DISCHARGED WITH ANY OF THE FOLLOWING DIAGNOSIS?: No





Plan


Discharge Plan: 


Follow-up with primary care physician in 1 week.


Time Spent: Less than 30 Minutes

## 2017-10-09 LAB
ANION GAP SERPL CALC-SCNC: 11 MMOL/L (ref 5–19)
BUN SERPL-MCNC: 18 MG/DL (ref 7–20)
CALCIUM: 9.4 MG/DL (ref 8.4–10.2)
CHLORIDE SERPL-SCNC: 107 MMOL/L (ref 98–107)
CO2 SERPL-SCNC: 28 MMOL/L (ref 22–30)
CREAT SERPL-MCNC: 0.66 MG/DL (ref 0.52–1.25)
ERYTHROCYTE [DISTWIDTH] IN BLOOD BY AUTOMATED COUNT: 14.7 % (ref 11.5–14)
GLUCOSE SERPL-MCNC: 92 MG/DL (ref 75–110)
HCT VFR BLD CALC: 42.3 % (ref 37.9–51)
HGB BLD-MCNC: 14.4 G/DL (ref 13.5–17)
HGB HCT DIFFERENCE: 0.9
MCH RBC QN AUTO: 31 PG (ref 27–33.4)
MCHC RBC AUTO-ENTMCNC: 34.1 G/DL (ref 32–36)
MCV RBC AUTO: 91 FL (ref 80–97)
POTASSIUM SERPL-SCNC: 4.5 MMOL/L (ref 3.6–5)
RBC # BLD AUTO: 4.64 10^6/UL (ref 4.35–5.55)
SODIUM SERPL-SCNC: 145.8 MMOL/L (ref 137–145)
WBC # BLD AUTO: 4.5 10^3/UL (ref 4–10.5)

## 2017-10-10 ENCOUNTER — HOSPITAL ENCOUNTER (OUTPATIENT)
Dept: HOSPITAL 62 - OROUT | Age: 67
Discharge: HOME | End: 2017-10-10
Attending: SURGERY
Payer: MEDICARE

## 2017-10-10 VITALS — SYSTOLIC BLOOD PRESSURE: 130 MMHG | DIASTOLIC BLOOD PRESSURE: 84 MMHG

## 2017-10-10 DIAGNOSIS — Z86.73: ICD-10-CM

## 2017-10-10 DIAGNOSIS — L89.324: Primary | ICD-10-CM

## 2017-10-10 DIAGNOSIS — Z87.891: ICD-10-CM

## 2017-10-10 DIAGNOSIS — I10: ICD-10-CM

## 2017-10-10 DIAGNOSIS — E87.5: ICD-10-CM

## 2017-10-10 DIAGNOSIS — Z79.82: ICD-10-CM

## 2017-10-10 DIAGNOSIS — I25.10: ICD-10-CM

## 2017-10-10 DIAGNOSIS — Z79.899: ICD-10-CM

## 2017-10-10 PROCEDURE — 88305 TISSUE EXAM BY PATHOLOGIST: CPT

## 2017-10-10 PROCEDURE — 36415 COLL VENOUS BLD VENIPUNCTURE: CPT

## 2017-10-10 PROCEDURE — 80048 BASIC METABOLIC PNL TOTAL CA: CPT

## 2017-10-10 PROCEDURE — 11042 DBRDMT SUBQ TIS 1ST 20SQCM/<: CPT

## 2017-10-10 PROCEDURE — 0JB90ZZ EXCISION OF BUTTOCK SUBCUTANEOUS TISSUE AND FASCIA, OPEN APPROACH: ICD-10-PCS | Performed by: SURGERY

## 2017-10-10 PROCEDURE — 85027 COMPLETE CBC AUTOMATED: CPT

## 2017-10-10 NOTE — PDOC H&P
General


Chief Complaint: 





This patient is admitted for debridement of the left buttock decubitus ulcer.  

It is tunneled and debridement and unroofing is recommended.





- Diagnosis


(1) Decubitus ulcer of buttock, stage 4


Is this a Current Diagnosis?: Yes   








- Current Medications/Allergies


Home Medications: 


 





Aspirin [Ecotrin 325 mg EC Tablet] 325 mg PO DAILY 08/08/17 


Atorvastatin Calcium [Lipitor 40 mg Tablet] 40 mg PO QHS 08/08/17 


Docusate Sodium [Colace 100 mg Capsule] 100 mg PO DAILY 08/08/17 


Finasteride [Proscar 5 mg Tablet] 5 mg PO DAILY 08/08/17 


Fluoxetine HCl [Prozac 20 mg Capsule] 20 mg PO DAILY 08/08/17 


Melatonin/Pyridoxine HCl (B6) [Melatonin 3 mg Tablet] 1 each PO QHS 08/08/17 


Tamsulosin HCl [Flomax 0.4 mg Cap.sr] 0.4 mg PO BID 08/08/17 


Baclofen [Baclofen 10 mg Tablet] 10 mg PO TID 10/09/17 


Temazepam [Restoril] 1 cap PO QHS PRN 10/09/17 


Pantoprazole Sodium [Protonix] 1 tab PO DAILY 10/10/17 








Allergies/Adverse Reactions: 


 





No Known Allergies Allergy (Verified 10/09/17 09:19)


 











Past Medical History


Cardiac Medical History: Reports: Coronary Artery Disease, Hyperlipidema, 

Hypertension


   Denies: Myocardial Infarction


Pulmonary Medical History: 


   Denies: Asthma, Bronchitis, Chronic Obstructive Pulmonary Disease (COPD), 

Pneumonia


Neurological Medical History: 


   Denies: Seizures


GI Medical History: Reports: Gastroesophageal Reflux Disease


Musculoskeltal Medical History: 


   Denies: Arthritis


Psychiatric Medical History: 


   Denies: Depression


Hematology: 


   Denies: Anemia





Family History


Family History: Reviewed & Not Pertinent


Parental Family History Reviewed: No


Children Family History Reviewed: No


Sibling(s) Family History Reviewed.: No





Social History


Smoking Status: Former Smoker


Frequency of Alcohol Use: Occasional


Hx Recreational Drug Use: No


Drugs: None


Hx Prescription Drug Abuse: No





Physical Exam


Vital Signs: 


 











Temp Pulse Resp BP Pulse Ox


 


 97.8 F   54 L  16   108/52 L  96 


 


 10/10/17 08:48  10/10/17 08:48  10/10/17 08:48  10/10/17 08:48  10/10/17 08:48








 Intake & Output











 10/09/17 10/10/17 10/11/17





 06:59 06:59 06:59


 


Intake Total   0


 


Balance   0


 


Weight  61 kg 61 kg











Additional comments: 





Constitutional: Well-developed well-nourished  gentleman.  No apparent 

acute distress.





Eyes: Mucous membranes pink and moist, pupils equal and reactive to light.  

Conjunctiva normal.  Cornea normal.





ENT: Hearing grossly normal.  External pinna normal to inspection.  Teeth 

intact.  Tongue normal to inspection.





Cardiac: Heart sounds 1 and 2 normal, no murmurs.





Respiratory breath sounds are present bilaterally, normal.  Normal respiratory 

effort.





Skin: Left buttock ulcers, otherwise normal turgor.








Psychiatric: Judgment, memory, insight seem normal.  Mood is pleasant and 

appropriate.  Does not speak much.





Extremities: Upper extremities reduced range of movement.  Pulses present noted 

to the radial arteries.  Capillary refill normal.  No cyanosis noted. .





Neurologic: Extremity weakness, paresis noted.





Impression/Plan


Plan: 





The plan is to admit the patient for debridement of left buttock ulcer.  As an 

outpatient.  The procedure, its risks, benefits, expected outcome and 

alternatives are familiar to the patient and his caregivers.  They wish to 

proceed.

## 2017-10-10 NOTE — PDOC DISCHARGE SUMMARY
Discharge Summary (SDC)





- Discharge


Final Diagnosis: 





1.  Chronic decubitus ulcer stage IV of left buttock.





2.  History of stroke.





3.  Coronary artery disease.





4.  Hypertension.


Date of Surgery: 10/10/17


Discharge Date: 10/10/17


Condition: Fair


Treatment or Instructions: 


Discharge home [after recovery per ASU criteria].


Diet,as tolerated, when fully awake advance as tolerated. 


Activities within moderation encouraged.


Follow up in wound clinic later this week. Call for appointment.


Leave wounds [covered], [keep clean and dry, until wound clinic visit later 

this week].  


Meds per med rec.


May shower [in 48 hrs], [try to keep operated area as dry as possible].


Referrals: 


CHRIS HADDAD DO [Primary Care Provider] - 


Discharge Diet: As Tolerated


Respiratory Treatments at Home: Deep Breathing/Coughing


Discharge Activity: Activity As Tolerated


Report the Following to Your Physician Immediately: Unusual Bleeding

## 2018-05-16 ENCOUNTER — HOSPITAL ENCOUNTER (OUTPATIENT)
Dept: HOSPITAL 62 - OD | Age: 68
End: 2018-05-16
Attending: NURSE PRACTITIONER
Payer: MEDICARE

## 2018-05-16 DIAGNOSIS — L89.323: Primary | ICD-10-CM

## 2018-05-16 PROCEDURE — 87205 SMEAR GRAM STAIN: CPT

## 2018-05-16 PROCEDURE — 87077 CULTURE AEROBIC IDENTIFY: CPT

## 2018-05-16 PROCEDURE — 87186 SC STD MICRODIL/AGAR DIL: CPT

## 2018-05-16 PROCEDURE — 87070 CULTURE OTHR SPECIMN AEROBIC: CPT

## 2018-06-06 ENCOUNTER — HOSPITAL ENCOUNTER (OUTPATIENT)
Dept: HOSPITAL 62 - WC | Age: 68
End: 2018-06-06
Attending: SURGERY
Payer: MEDICARE

## 2018-06-06 DIAGNOSIS — L89.323: Primary | ICD-10-CM

## 2018-06-06 PROCEDURE — 72170 X-RAY EXAM OF PELVIS: CPT

## 2018-06-06 NOTE — RADIOLOGY REPORT (SQ)
EXAM DESCRIPTION:  PELVIS AP



COMPLETED DATE/TIME:  6/6/2018 12:49 pm



REASON FOR STUDY:  PRESSURE ULCER OF LEFT BUTTOCK, STAGE 3



COMPARISON:  None.



NUMBER OF VIEWS:  Single view pelvis to include proximal femurs.



LIMITATIONS:  Limiting gas and stool overlying the pelvis, particularly obscuring the sacrum.



FINDINGS:  As assessed, bones look intact without any evidence of erosion or fracture.  Low sensitivi
ty for osteomyelitis, however.  If clinically warranted, MRI of the pelvis may prove helpful to furth
er assess the areas of interest.

OTHER: No other significant finding.



IMPRESSION:  No worrisome findings.  As above.



TECHNICAL DOCUMENTATION:  JOB ID:  [id number]



Reading location - IP/workstation name: [email or web address]

## 2018-08-24 ENCOUNTER — HOSPITAL ENCOUNTER (OUTPATIENT)
Dept: HOSPITAL 62 - OD | Age: 68
End: 2018-08-24
Attending: NURSE PRACTITIONER
Payer: MEDICARE

## 2018-08-24 DIAGNOSIS — L89.153: Primary | ICD-10-CM

## 2018-08-24 PROCEDURE — 72220 X-RAY EXAM SACRUM TAILBONE: CPT

## 2018-08-24 NOTE — RADIOLOGY REPORT (SQ)
EXAM DESCRIPTION:  SACRUM AND COCCYX



COMPLETED DATE/TIME:  8/24/2018 11:03 am



REASON FOR STUDY:  PRESSURE ULCER OF SACRAL REGION, STAGE 3 L89.153  PRESSURE ULCER OF SACRAL REGION,
 STAGE 3



COMPARISON:  None.



NUMBER OF VIEWS:  Three views.



TECHNIQUE:  AP, lateral, and tilt views of the sacrum and coccyx.



LIMITATIONS:  None.



FINDINGS:  MINERALIZATION: Normal.

BONES: No acute fracture or dislocation.  No worrisome bone lesions.

SOFT TISSUES: No soft tissue swelling.  No foreign body.

OTHER: No other significant finding.



IMPRESSION:  NEGATIVE STUDY OF THE SACRUM AND COCCYX.



TECHNICAL DOCUMENTATION:  JOB ID:  [id number]

 2011 [employer]- All Rights Reserved



Reading location - IP/workstation name: [email or web address]

## 2018-09-26 ENCOUNTER — HOSPITAL ENCOUNTER (EMERGENCY)
Dept: HOSPITAL 62 - ER | Age: 68
LOS: 1 days | Discharge: HOME | End: 2018-09-27
Payer: MEDICARE

## 2018-09-26 DIAGNOSIS — E78.00: ICD-10-CM

## 2018-09-26 DIAGNOSIS — I25.10: ICD-10-CM

## 2018-09-26 DIAGNOSIS — M25.552: Primary | ICD-10-CM

## 2018-09-26 DIAGNOSIS — I10: ICD-10-CM
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
## 2018-09-26 DIAGNOSIS — Z79.82: ICD-10-CM

## 2018-09-26 DIAGNOSIS — I69.954: ICD-10-CM

## 2018-09-26 DIAGNOSIS — F17.200: ICD-10-CM

## 2018-09-26 PROCEDURE — 99283 EMERGENCY DEPT VISIT LOW MDM: CPT

## 2018-09-26 NOTE — RADIOLOGY REPORT (SQ)
EXAM DESCRIPTION: 



XR HIP 2 OR MORE VIEWS



COMPLETED DATE/TME:  09/26/2018 21:50



CLINICAL HISTORY: 



68 years, Male, pain s/p fall



COMPARISON:

None.



NUMBER OF VIEWS:

Two



TECHNIQUE:

Two views of the left hip were done



LIMITATIONS:

None.



FINDINGS:



There is no fracture or dislocation involving the bones of the

pelvis including the left hip joint



IMPRESSION:



Negative for acute bony trauma involving the left hip or the bony

pelvis

 



 2011 [employer]- All Rights Reserved
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
## 2018-09-27 VITALS — SYSTOLIC BLOOD PRESSURE: 117 MMHG | DIASTOLIC BLOOD PRESSURE: 51 MMHG

## 2018-12-14 ENCOUNTER — HOSPITAL ENCOUNTER (OUTPATIENT)
Dept: HOSPITAL 62 - WC | Age: 68
End: 2018-12-14
Attending: PLASTIC SURGERY
Payer: MEDICARE

## 2018-12-14 DIAGNOSIS — L89.323: Primary | ICD-10-CM

## 2018-12-14 LAB
ADD MANUAL DIFF: NO
ALBUMIN SERPL-MCNC: 4.1 G/DL (ref 3.5–5)
ALP SERPL-CCNC: 85 U/L (ref 38–126)
ALT SERPL-CCNC: 26 U/L (ref 21–72)
ANION GAP SERPL CALC-SCNC: 12 MMOL/L (ref 5–19)
AST SERPL-CCNC: 29 U/L (ref 17–59)
BASOPHILS # BLD AUTO: 0 10^3/UL (ref 0–0.2)
BASOPHILS NFR BLD AUTO: 0.5 % (ref 0–2)
BILIRUB DIRECT SERPL-MCNC: 0.2 MG/DL (ref 0–0.4)
BILIRUB SERPL-MCNC: 0.5 MG/DL (ref 0.2–1.3)
BUN SERPL-MCNC: 21 MG/DL (ref 7–20)
CALCIUM: 9.7 MG/DL (ref 8.4–10.2)
CHLORIDE SERPL-SCNC: 108 MMOL/L (ref 98–107)
CO2 SERPL-SCNC: 24 MMOL/L (ref 22–30)
CRP SERPL-MCNC: < 5 MG/L (ref ?–10)
EOSINOPHIL # BLD AUTO: 0.1 10^3/UL (ref 0–0.6)
EOSINOPHIL NFR BLD AUTO: 1.7 % (ref 0–6)
ERYTHROCYTE [DISTWIDTH] IN BLOOD BY AUTOMATED COUNT: 14.5 % (ref 11.5–14)
ERYTHROCYTE [SEDIMENTATION RATE] IN BLOOD: 17 MM/HR (ref 0–20)
GLUCOSE SERPL-MCNC: 105 MG/DL (ref 75–110)
HCT VFR BLD CALC: 43.2 % (ref 37.9–51)
HGB BLD-MCNC: 15 G/DL (ref 13.5–17)
LYMPHOCYTES # BLD AUTO: 1.5 10^3/UL (ref 0.5–4.7)
LYMPHOCYTES NFR BLD AUTO: 26.8 % (ref 13–45)
MCH RBC QN AUTO: 31.1 PG (ref 27–33.4)
MCHC RBC AUTO-ENTMCNC: 34.6 G/DL (ref 32–36)
MCV RBC AUTO: 90 FL (ref 80–97)
MONOCYTES # BLD AUTO: 0.3 10^3/UL (ref 0.1–1.4)
MONOCYTES NFR BLD AUTO: 5.8 % (ref 3–13)
NEUTROPHILS # BLD AUTO: 3.5 10^3/UL (ref 1.7–8.2)
NEUTS SEG NFR BLD AUTO: 65.2 % (ref 42–78)
PLATELET # BLD: 180 10^3/UL (ref 150–450)
POTASSIUM SERPL-SCNC: 4.5 MMOL/L (ref 3.6–5)
PROT SERPL-MCNC: 7.1 G/DL (ref 6.3–8.2)
RBC # BLD AUTO: 4.81 10^6/UL (ref 4.35–5.55)
SODIUM SERPL-SCNC: 144 MMOL/L (ref 137–145)
TOTAL CELLS COUNTED % (AUTO): 100 %
WBC # BLD AUTO: 5.4 10^3/UL (ref 4–10.5)

## 2018-12-14 PROCEDURE — 80053 COMPREHEN METABOLIC PANEL: CPT

## 2018-12-14 PROCEDURE — 36415 COLL VENOUS BLD VENIPUNCTURE: CPT

## 2018-12-14 PROCEDURE — 85652 RBC SED RATE AUTOMATED: CPT

## 2018-12-14 PROCEDURE — 85025 COMPLETE CBC W/AUTO DIFF WBC: CPT

## 2018-12-14 PROCEDURE — 86140 C-REACTIVE PROTEIN: CPT

## 2018-12-14 NOTE — RADIOLOGY REPORT (SQ)
EXAM DESCRIPTION:  HIP LEFT AP/LATERAL



COMPLETED DATE/TIME:  12/14/2018 9:56 am



REASON FOR STUDY:  PRESSURE ULCER OF LEFT BUTTOCK, STAGE 3 L89.323  PRESSURE ULCER OF LEFT BUTTOCK, S
TAGE 3



COMPARISON:  Pelvis films 6/6/2018, 8/24/2018, 9/26/2018



NUMBER OF VIEWS:  Two views.



TECHNIQUE:  AP pelvis and additional frog-leg view of the left hip.



LIMITATIONS:  None.



FINDINGS:  MINERALIZATION: Age-appropriate osteopenia

LEFT HIP: No fracture or dislocation.  No worrisome bone lesions.  Wound VAC is present over the left
 greater trochanteric region.  No underlying aggressive bony demineralization worrisome for osteomyel
itis over the left hip or ischium.

RIGHT HIP: No fracture or dislocation.  No worrisome bone lesions.

PUBIS AND ISCHIUM: No aggressive bony demineralization left ischium worrisome for active osteomyeliti
s

PELVIS: No fracture.

SACRUM: Grossly intact

LOWER LUMBAR SPINE: Lower lumbar facet arthropathy

SOFT TISSUES: Wound VAC over the left gluteal/ greater trochanteric region

OTHER: No other significant finding.



IMPRESSION:  Wound VAC over the left gluteal/ greater trochanteric region without underlying aggressi
ve bony demineralization to suggest osteomyelitis



TECHNICAL DOCUMENTATION:  JOB ID:  [id number]

 2011 [employer]- All Rights Reserved



Reading location - IP/workstation name: [email or web address]

## 2019-07-16 ENCOUNTER — HOSPITAL ENCOUNTER (EMERGENCY)
Dept: HOSPITAL 62 - ER | Age: 69
Discharge: HOME | End: 2019-07-16
Payer: MEDICARE

## 2019-07-16 VITALS — DIASTOLIC BLOOD PRESSURE: 53 MMHG | SYSTOLIC BLOOD PRESSURE: 107 MMHG

## 2019-07-16 DIAGNOSIS — Z87.891: ICD-10-CM

## 2019-07-16 DIAGNOSIS — N50.82: ICD-10-CM

## 2019-07-16 DIAGNOSIS — R00.0: ICD-10-CM

## 2019-07-16 DIAGNOSIS — E04.1: ICD-10-CM

## 2019-07-16 DIAGNOSIS — R30.0: ICD-10-CM

## 2019-07-16 DIAGNOSIS — R53.83: ICD-10-CM

## 2019-07-16 DIAGNOSIS — I69.354: ICD-10-CM

## 2019-07-16 DIAGNOSIS — R50.9: ICD-10-CM

## 2019-07-16 DIAGNOSIS — M47.9: ICD-10-CM

## 2019-07-16 DIAGNOSIS — N30.01: Primary | ICD-10-CM

## 2019-07-16 DIAGNOSIS — I25.10: ICD-10-CM

## 2019-07-16 DIAGNOSIS — I10: ICD-10-CM

## 2019-07-16 LAB
ADD MANUAL DIFF: NO
ALBUMIN SERPL-MCNC: 3.4 G/DL (ref 3.5–5)
ALP SERPL-CCNC: 82 U/L (ref 38–126)
ALT SERPL-CCNC: 58 U/L (ref 21–72)
ANION GAP SERPL CALC-SCNC: 10 MMOL/L (ref 5–19)
APPEARANCE UR: (no result)
APPEARANCE UR: (no result)
APTT PPP: (no result) S
APTT PPP: (no result) S
AST SERPL-CCNC: 69 U/L (ref 17–59)
BASE EXCESS BLDV CALC-SCNC: 1.1 MMOL/L
BASOPHILS # BLD AUTO: 0 10^3/UL (ref 0–0.2)
BASOPHILS NFR BLD AUTO: 0.2 % (ref 0–2)
BILIRUB DIRECT SERPL-MCNC: 0.2 MG/DL (ref 0–0.4)
BILIRUB SERPL-MCNC: 0.4 MG/DL (ref 0.2–1.3)
BILIRUB UR QL STRIP: NEGATIVE
BILIRUB UR QL STRIP: NEGATIVE
BUN SERPL-MCNC: 22 MG/DL (ref 7–20)
CALCIUM: 9.1 MG/DL (ref 8.4–10.2)
CHLORIDE SERPL-SCNC: 107 MMOL/L (ref 98–107)
CO2 SERPL-SCNC: 25 MMOL/L (ref 22–30)
EOSINOPHIL # BLD AUTO: 0 10^3/UL (ref 0–0.6)
EOSINOPHIL NFR BLD AUTO: 0.2 % (ref 0–6)
ERYTHROCYTE [DISTWIDTH] IN BLOOD BY AUTOMATED COUNT: 15.2 % (ref 11.5–14)
GLUCOSE SERPL-MCNC: 166 MG/DL (ref 75–110)
GLUCOSE UR STRIP-MCNC: NEGATIVE MG/DL
GLUCOSE UR STRIP-MCNC: NEGATIVE MG/DL
HCO3 BLDV-SCNC: 24 MMOL/L (ref 20–32)
HCT VFR BLD CALC: 36.4 % (ref 37.9–51)
HGB BLD-MCNC: 12.3 G/DL (ref 13.5–17)
KETONES UR STRIP-MCNC: NEGATIVE MG/DL
KETONES UR STRIP-MCNC: NEGATIVE MG/DL
LYMPHOCYTES # BLD AUTO: 0.6 10^3/UL (ref 0.5–4.7)
LYMPHOCYTES NFR BLD AUTO: 7.4 % (ref 13–45)
MCH RBC QN AUTO: 30.3 PG (ref 27–33.4)
MCHC RBC AUTO-ENTMCNC: 33.7 G/DL (ref 32–36)
MCV RBC AUTO: 90 FL (ref 80–97)
MONOCYTES # BLD AUTO: 0.5 10^3/UL (ref 0.1–1.4)
MONOCYTES NFR BLD AUTO: 6.7 % (ref 3–13)
NEUTROPHILS # BLD AUTO: 7 10^3/UL (ref 1.7–8.2)
NEUTS SEG NFR BLD AUTO: 85.5 % (ref 42–78)
NITRITE UR QL STRIP: POSITIVE
NITRITE UR QL STRIP: POSITIVE
PCO2 BLDV: 33.2 MMHG (ref 35–63)
PH BLDV: 7.48 [PH] (ref 7.3–7.42)
PH UR STRIP: 5 [PH] (ref 5–9)
PH UR STRIP: 5 [PH] (ref 5–9)
PLATELET # BLD: 227 10^3/UL (ref 150–450)
POTASSIUM SERPL-SCNC: 3.9 MMOL/L (ref 3.6–5)
PROT SERPL-MCNC: 6.3 G/DL (ref 6.3–8.2)
PROT UR STRIP-MCNC: 100 MG/DL
PROT UR STRIP-MCNC: 100 MG/DL
RBC # BLD AUTO: 4.05 10^6/UL (ref 4.35–5.55)
SODIUM SERPL-SCNC: 142.3 MMOL/L (ref 137–145)
SP GR UR STRIP: 1.02
SP GR UR STRIP: 1.02
TOTAL CELLS COUNTED % (AUTO): 100 %
UROBILINOGEN UR-MCNC: NEGATIVE MG/DL (ref ?–2)
UROBILINOGEN UR-MCNC: NEGATIVE MG/DL (ref ?–2)
WBC # BLD AUTO: 8.2 10^3/UL (ref 4–10.5)

## 2019-07-16 PROCEDURE — 76870 US EXAM SCROTUM: CPT

## 2019-07-16 PROCEDURE — 70450 CT HEAD/BRAIN W/O DYE: CPT

## 2019-07-16 PROCEDURE — 87186 SC STD MICRODIL/AGAR DIL: CPT

## 2019-07-16 PROCEDURE — 81001 URINALYSIS AUTO W/SCOPE: CPT

## 2019-07-16 PROCEDURE — 93976 VASCULAR STUDY: CPT

## 2019-07-16 PROCEDURE — 80053 COMPREHEN METABOLIC PANEL: CPT

## 2019-07-16 PROCEDURE — 87088 URINE BACTERIA CULTURE: CPT

## 2019-07-16 PROCEDURE — 72125 CT NECK SPINE W/O DYE: CPT

## 2019-07-16 PROCEDURE — 87040 BLOOD CULTURE FOR BACTERIA: CPT

## 2019-07-16 PROCEDURE — 87086 URINE CULTURE/COLONY COUNT: CPT

## 2019-07-16 PROCEDURE — 85025 COMPLETE CBC W/AUTO DIFF WBC: CPT

## 2019-07-16 PROCEDURE — 83605 ASSAY OF LACTIC ACID: CPT

## 2019-07-16 PROCEDURE — 82803 BLOOD GASES ANY COMBINATION: CPT

## 2019-07-16 PROCEDURE — 36415 COLL VENOUS BLD VENIPUNCTURE: CPT

## 2019-07-16 NOTE — RADIOLOGY REPORT (SQ)
EXAM DESCRIPTION: 



CT HEAD WITHOUT INTRAVENOUS CONTRAST



CLINICAL HISTORY: 



Fall. Trauma.



COMPARISON: 



CT 8/8/2017.



TECHNIQUE: 



CT of the head was performed without intravenous contrast .This

exam was performed according to our departmental

dose-optimization program, which includes automated exposure

control, adjustment of the mA and/or KV according to the

patient's size and/or use of iterative reconstruction technique.



FINDINGS: 



Moderate-sized old right MCA distribution infarction with

associated dilatation of the right lateral ventricle. Small size

old high left posterior parietal cortical infarction.

Mild-to-moderate ventriculomegaly.



Only mild prominence of the cortical sulci. No obvious

hydrocephalus. No change since 2017. No acute intra-axial or

extra-axial lesions. Atherosclerotic disease of the distal

internal carotid and vertebral arteries. Mastoid air cells and

bony calvarium are unremarkable. Minimal chronic inflammatory

changes in the paranasal sinuses.



 IMPRESSION: 



Moderate old right MCA infarction. Small old left high parietal

infarction. Size of ventricles slightly larger compared to size

of sulci. No change. No obvious hydrocephalus. No acute findings.

## 2019-07-16 NOTE — RADIOLOGY REPORT (SQ)
EXAM DESCRIPTION: 



CT CERVICAL SPINE WITHOUT IV CONTRAST



COMPLETED DATE/TME:  07/16/2019 17:57



CLINICAL HISTORY: 



69 years, Male, fall



COMPARISON:

None.



TECHNIQUE:

Noncontrast CT of the cervical spine was performed. Coronal and

sagittal reformations were created.  Images stored on PACS.

 

All CT scanners at this facility use dose modulation, iterative

reconstruction, and/or weight based dosing when appropriate to

reduce radiation dose to as low as reasonably achievable (ALARA).





[facility]: Dose Right [facility]: CareDose   [facility]: Dose Right    [facility]:

Teradose 4D    [facility]: Smart Technologies



LIMITATIONS:

None.



FINDINGS:



Limited evaluation of the posterior fossa reveals no suspicious

finding.



Occipital condyles are normal. Lateral masses of C1 and C2 align

properly. Base and tip of the dens are intact. Craniocervical

alignment is maintained.



Cervical vertebral body heights and alignments are maintained. No

acute fracture or malalignment is appreciated.



Mild multilevel cervical spondylosis is noted, designated by

multilevel intervertebral space narrowing and anterior plate

spurring. Likewise, multilevel facet arthropathy is also evident.

However, no resultant significant neural foraminal stenosis is

evident.



Visualized lung apices are clear.



Calcifications are noted about the thoracic aortic arch. In

addition, a few low-density lesions are noted about the thyroid

parenchyma, the largest of which measures 0.8 cm in size.

Calcifications are noted about the carotid vasculature

bilaterally.





IMPRESSION:



No acute fracture or malalignment.



Mild multilevel cervical spondylosis.



Subcentimeter incidental thyroid nodule. No follow-up imaging is

recommended.

Reference: J Am [employer] Radiol. 2015 Feb;12(2): 143-50

 

TECHNICAL DOCUMENTATION:



Quality ID # 436: Final reports with documentation of one or more

dose reduction techniques (e.g., Automated exposure control,

adjustment of the mA and/or kV according to patient size, use of

iterative reconstruction technique)



copyright 2011 [employer]- All Rights Reserved

## 2019-07-16 NOTE — ER DOCUMENT REPORT
ED Medical Screen (RME)





- General


Chief Complaint: Testicular Pain


Stated Complaint: FEVER


Time Seen by Provider: 07/16/19 17:50


Primary Care Provider: 


LELAND RAMIREZ MD [ACTIVE STAFF] - Follow up as needed


Mode of Arrival: Wheelchair


Information source: Patient, Relative


Notes: 





This 69-year-old man presents to the emergency department with complaints of 

right testicular pain and fever.  He reports pain with void.  No swelling to his

testicle.  Son reports that he just went to the restroom and they noticed blood 

in the urine.  He took 500 mg of Tylenol prior to arrival temperature is still 

102.9.  Son reports he has been weak and he has fallen twice.  Patient reports 

he fell out of bed.  Also reports lower abdominal pain.  Reports it feels like 

he is been doing a lot of sit ups.








I have greeted and performed a rapid initial assessment of this patient.  A co

mprehensive ED assessment and evaluation of the patient, analysis of test 

results and completion of the medical decision making process will be conducted 

by additional ED providers.








Dictation of this chart was performed using voice recognition software; 

therefore, there may be some unintended grammatical errors.


TRAVEL OUTSIDE OF THE [place] IN LAST 30 DAYS: No





- Related Data


Allergies/Adverse Reactions: 


                                        





No Known Allergies Allergy (Verified 07/16/19 15:59)


   











Past Medical History





- Social History


Chew tobacco use (# tins/day): No


Frequency of alcohol use: None


Drug Abuse: None





- Past Medical History


Cardiac Medical History: Reports: Hx Coronary Artery Disease, Hx Hyper

cholesterolemia, Hx Hypertension


   Denies: Hx Heart Attack


Pulmonary Medical History: 


   Denies: Hx Asthma, Hx Bronchitis, Hx COPD, Hx Pneumonia


Neurological Medical History: Reports: Hx Cerebrovascular Accident - 07/2015 - 

LEFT SIDED WEAKNESS.  Denies: Hx Seizures


Renal/ Medical History: Denies: Hx Peritoneal Dialysis


GI Medical History: Reports: Hx Gastroesophageal Reflux Disease


Musculoskeltal Medical History: Denies Hx Arthritis


Psychiatric Medical History: 


   Denies: Hx Depression





- Immunizations


Hx Diphtheria, Pertussis, Tetanus Vaccination: No


History of Influenza Vaccine for 10/2017 - 3/2018 Season: No





Physical Exam





- Vital signs


Vitals: 


                                        











Temp Pulse Resp BP Pulse Ox


 


 102.5 F H  100   16   117/44 L  93 


 


 07/16/19 16:04  07/16/19 16:04  07/16/19 16:04  07/16/19 16:04  07/16/19 16:04














Course





- Vital Signs


Vital signs: 


                                        











Temp Pulse Resp BP Pulse Ox


 


 102.7 F H  100   16   117/44 L  93 


 


 07/16/19 18:12  07/16/19 16:04  07/16/19 16:04  07/16/19 16:04  07/16/19 16:04














- Laboratory


Result Diagrams: 


                                 07/16/19 18:20





                                 07/16/19 18:20


Laboratory results interpreted by me: 


                                        











  07/16/19 07/16/19 07/16/19





  16:24 18:20 18:20


 


RBC   4.05 L 


 


Hgb   12.3 L 


 


Hct   36.4 L 


 


RDW   15.2 H 


 


Seg Neutrophils %   85.5 H 


 


Lymphocytes %   7.4 L 


 


VBG pH   


 


VBG pCO2   


 


BUN    22 H


 


Glucose    166 H


 


AST    69 H


 


Albumin    3.4 L


 


Urine Protein  100 H  


 


Urine Blood  LARGE H  


 


Urine Nitrite  POSITIVE H  


 


Ur Leukocyte Esterase  LARGE H  














  07/16/19 07/16/19





  18:20 18:20


 


RBC  


 


Hgb  


 


Hct  


 


RDW  


 


Seg Neutrophils %  


 


Lymphocytes %  


 


VBG pH  7.48 H 


 


VBG pCO2  33.2 L 


 


BUN  


 


Glucose  


 


AST  


 


Albumin  


 


Urine Protein   100 H


 


Urine Blood   LARGE H


 


Urine Nitrite   POSITIVE H


 


Ur Leukocyte Esterase   LARGE H














Doctor's Discharge





- Discharge


Referrals: 


LELAND RAMIREZ MD [ACTIVE STAFF] - Follow up as needed

## 2019-07-16 NOTE — RADIOLOGY REPORT (SQ)
EXAM DESCRIPTION:  U/S SCROTUM W/DOPPLER



COMPLETED DATE/TIME:  7/16/2019 7:44 pm



REASON FOR STUDY:  testicular pain



COMPARISON:  None.



TECHNIQUE:  Static and realtime gray scale imaging of the scrotum and testes.  Selected color Doppler
 and spectral images recorded to document blood flow.



LIMITATIONS:  None.



FINDINGS:  RIGHT:

TESTICLE: Normal size. Normal echotexture.  Normal blood flow.  No mass.

EPIDIDYMIS: 10 mm cyst.

HYDROCELE OR VARICOCELE: Small hydrocele.

HERNIA OR EXTRA-TESTICULAR MASS: No.

OTHER: No other significant finding.

LEFT:

TESTICLE: Normal size. Normal echotexture.  Normal blood flow.  No mass.

EPIDIDYMIS: 9 mm cyst.

HYDROCELE OR VARICOCELE: No.

HERNIA OR EXTRA-TESTICULAR MASS: No.

OTHER: No other significant finding.



IMPRESSION:  NO EVIDENCE OF TESTICULAR MASS OR TORSION.



TECHNICAL DOCUMENTATION:  JOB ID:  [id number]

 2011 [employer]- All Rights Reserved



Reading location - IP/workstation name: [email or web address]

## 2019-12-10 ENCOUNTER — HOSPITAL ENCOUNTER (OUTPATIENT)
Dept: HOSPITAL 62 - OD | Age: 69
End: 2019-12-10
Attending: PSYCHIATRY & NEUROLOGY
Payer: MEDICARE

## 2019-12-10 DIAGNOSIS — G31.84: Primary | ICD-10-CM

## 2019-12-10 DIAGNOSIS — G91.9: ICD-10-CM

## 2019-12-10 DIAGNOSIS — I63.9: ICD-10-CM

## 2019-12-10 DIAGNOSIS — G47.33: ICD-10-CM

## 2019-12-10 LAB
FOLATE SERPL-MCNC: > 20 NG/ML (ref 2.76–?)
FREE T4 (FREE THYROXINE): 0.97 NG/DL (ref 0.78–2.19)
T3FREE SERPL-MCNC: 3.31 PG/ML (ref 2.77–5.27)
TSH SERPL-ACNC: 0.86 UIU/ML (ref 0.47–4.68)

## 2019-12-10 PROCEDURE — 84443 ASSAY THYROID STIM HORMONE: CPT

## 2019-12-10 PROCEDURE — 84439 ASSAY OF FREE THYROXINE: CPT

## 2019-12-10 PROCEDURE — 82746 ASSAY OF FOLIC ACID SERUM: CPT

## 2019-12-10 PROCEDURE — 36415 COLL VENOUS BLD VENIPUNCTURE: CPT

## 2019-12-10 PROCEDURE — 82607 VITAMIN B-12: CPT

## 2019-12-10 PROCEDURE — 84481 FREE ASSAY (FT-3): CPT

## 2022-07-17 NOTE — OPERATIVE REPORT
Operative Report


DATE OF SURGERY: 10/10/17


PREOPERATIVE DIAGNOSIS: 1.  Chronic decubitus ulcer stage IV of left buttock.  

2.  History of stroke.  3.  Coronary artery disease.  4.  Hypertension.


POSTOPERATIVE DIAGNOSIS: 1.  Chronic decubitus ulcer stage IV of left buttock.  

Post debridement.  2.  History of stroke.  3.  Coronary artery disease.  4.  

Hypertension.


OPERATION: Surgical, sharp, excisional debridement of left buttock abscess/

decubitus stage IV.


SURGEON: LENNOX WILLIAMS 1ST ASSISTANT: DOROTA MCCALL


ANESTHESIA: LMAC


TISSUE REMOVED OR ALTERED: Portion of chronic ulcer cavity and overlying skin.


COMPLICATIONS: 





None


ESTIMATED BLOOD LOSS: 5 mL.


INTRAOPERATIVE FINDINGS: Of an approximately 0.5 cm opening leading into a 

cavity 3 x 2 cm in diameter.  This cavity was excised and partially submitted 

for pathology.


PROCEDURE: 





PROCEDURE:


 The left buttock was prepared with [Betadine] and draped out with sterile 

linen.  After the"universal time-out", in which it was confirmed that the 

patient [did receive antibiotic], the procedure commenced.  


The patient was appropriately anesthetized.


The wound was probed.  The outline of the tunnel noted the wound was debrided 

of non viable tissue using[Rongeurs] with removal of loose debris, as well.  

The overlying skin and subcutaneous tissue and chronic also wall were excised 

with cautery.  The wound was irrigated with [Peroxide] . The wound was now 

irrigated with saline and [Surgicel] placed within it, dressed with [Kerlix] 

and the procedure concluded.


The first assistant provided retraction, thus facilitating the operative view.  

Controlled bleeding. 


The first assistant also clean and applied dressings.
no